# Patient Record
Sex: MALE | Race: WHITE | NOT HISPANIC OR LATINO | URBAN - METROPOLITAN AREA
[De-identification: names, ages, dates, MRNs, and addresses within clinical notes are randomized per-mention and may not be internally consistent; named-entity substitution may affect disease eponyms.]

---

## 2022-08-19 ENCOUNTER — OFFICE (OUTPATIENT)
Dept: URBAN - METROPOLITAN AREA CLINIC 102 | Facility: CLINIC | Age: 32
End: 2022-08-19

## 2022-08-19 VITALS
TEMPERATURE: 97.3 F | HEART RATE: 79 BPM | DIASTOLIC BLOOD PRESSURE: 89 MMHG | WEIGHT: 179 LBS | SYSTOLIC BLOOD PRESSURE: 143 MMHG | HEIGHT: 70 IN

## 2022-08-19 DIAGNOSIS — R74.8 ABNORMAL LEVELS OF OTHER SERUM ENZYMES: ICD-10-CM

## 2022-08-19 PROCEDURE — 99204 OFFICE O/P NEW MOD 45 MIN: CPT | Performed by: INTERNAL MEDICINE

## 2022-09-12 LAB
ACTIN (SMOOTH MUSCLE) ANTIBODY: 4 UNITS (ref 0–19)
ALPHA-1-ANTITRYPSIN, SERUM: 125 MG/DL (ref 95–164)
ANTI-MITOCHONDRIAL AB BY IFA: (no result)
ANTINUCLEAR ANTIBODIES, IFA: NEGATIVE
CELIAC DISEASE COMPREHENSIVE: DEAMIDATED GLIADIN ABS, IGA: 3 UNITS (ref 0–19)
CELIAC DISEASE COMPREHENSIVE: DEAMIDATED GLIADIN ABS, IGG: 3 UNITS (ref 0–19)
CELIAC DISEASE COMPREHENSIVE: ENDOMYSIAL ANTIBODY IGA: NEGATIVE
CELIAC DISEASE COMPREHENSIVE: IMMUNOGLOBULIN A, QN, SERUM: 245 MG/DL (ref 90–386)
CELIAC DISEASE COMPREHENSIVE: T-TRANSGLUTAMINASE (TTG) IGA: <2 U/ML
CELIAC DISEASE COMPREHENSIVE: T-TRANSGLUTAMINASE (TTG) IGG: <2 U/ML
CERULOPLASMIN: 26.7 MG/DL (ref 16–31)
FERRITIN: 363 NG/ML (ref 30–400)
HBSAG SCREEN: NEGATIVE
HCV ANTIBODY: HEP C VIRUS AB: <0.1 S/CO RATIO
HEP A AB, TOTAL: NEGATIVE
HEP B CORE AB, IGM: NEGATIVE
HEP B CORE AB, TOT: NEGATIVE
HEPATITIS B SURF AB QUANT: 144.9 MIU/ML (ref 9.9–?)
IMMUNOGLOBULIN G, QN, SERUM: 953 MG/DL (ref 603–1613)
IMMUNOGLOBULIN M, QN, SERUM: 123 MG/DL (ref 20–172)
IRON AND TIBC: IRON BIND.CAP.(TIBC): 306 UG/DL (ref 250–450)
IRON AND TIBC: IRON SATURATION: 38 % (ref 15–55)
IRON AND TIBC: IRON: 117 UG/DL (ref 38–169)
IRON AND TIBC: UIBC: 189 UG/DL (ref 111–343)
LIVER-KIDNEY MICROSOMAL AB: 0.7 UNITS (ref 0–20)
MITOCHONDRIAL (M2) ANTIBODY: <20 UNITS
PROTHROMBIN TIME (PT): INR: 1 (ref 0.9–1.2)
PROTHROMBIN TIME (PT): PROTHROMBIN TIME: 10.3 SEC (ref 9.1–12)

## 2022-09-15 ENCOUNTER — TELEHEALTH PROVIDED IN PATIENT'S HOME (OUTPATIENT)
Dept: URBAN - METROPOLITAN AREA TELEHEALTH 3 | Facility: TELEHEALTH | Age: 32
End: 2022-09-15

## 2022-09-15 VITALS — HEIGHT: 70 IN | WEIGHT: 172 LBS

## 2022-09-15 DIAGNOSIS — R74.8 ABNORMAL LEVELS OF OTHER SERUM ENZYMES: ICD-10-CM

## 2022-09-15 DIAGNOSIS — K76.0 FATTY (CHANGE OF) LIVER, NOT ELSEWHERE CLASSIFIED: ICD-10-CM

## 2022-09-15 DIAGNOSIS — R16.0 HEPATOMEGALY, NOT ELSEWHERE CLASSIFIED: ICD-10-CM

## 2022-09-15 PROCEDURE — 99213 OFFICE O/P EST LOW 20 MIN: CPT | Mod: 95 | Performed by: INTERNAL MEDICINE

## 2022-09-15 NOTE — SERVICEHPINOTES
PATIENT VERIFIED BY DATE OF BIRTH AND NAME. Patient has been consented for this telecommunication visit.
br

brLast visit 8/19
[id number] with hx of paroxysmal atrial fibrillation and "enlarged heart" presenting for evaluation of elevated liver enzymes.brHe presented to an urgent care 5/27 with weakness and palpitations and incidentally noted to have elevated liver enzymesbrAST:106, ALT:158, normal ALK and Tbili. Repeat labs were done July 2022-notes ASt:70, NO ALT, Tbili: 1.6, ALK:68. brRUQ US 2019 and 4/2020: small hypoechoic mass in the right anterior lobe 8mm- stable from 2019-likely hemagioma.brNo family history of liver disease. Drinks 5 mixed drinks daily for the past 4 years and 7 drinks on weekends. Occasional NSAID. No illicit drugs or any herbal supplements.br
br Interval history
brLabs were done but not in our system yet.
brRUQ US was done that showed significantly enlarged liver with suspected fatty infilteration and incomplete assessment of liver lesion (thought to be hemangioma) in the past. He is concerned about the findings and wanted to further discuss.
br He is cutting back on alcohol use. No other changes in his sx otherwise.
br
br br
br